# Patient Record
Sex: MALE | Race: WHITE | Employment: OTHER | URBAN - METROPOLITAN AREA
[De-identification: names, ages, dates, MRNs, and addresses within clinical notes are randomized per-mention and may not be internally consistent; named-entity substitution may affect disease eponyms.]

---

## 2019-04-26 ENCOUNTER — TRANSFERRED RECORDS (OUTPATIENT)
Dept: HEALTH INFORMATION MANAGEMENT | Facility: CLINIC | Age: 64
End: 2019-04-26

## 2019-05-02 ENCOUNTER — HOSPITAL ENCOUNTER (OUTPATIENT)
Dept: MRI IMAGING | Facility: HOSPITAL | Age: 64
Discharge: HOME | End: 2019-05-02
Attending: PHYSICIAN ASSISTANT | Admitting: PHYSICIAN ASSISTANT
Payer: COMMERCIAL

## 2019-05-02 DIAGNOSIS — S46.092A OTHER INJURY OF MUSCLE(S) AND TENDON(S) OF THE ROTATOR CUFF OF LEFT SHOULDER, INITIAL ENCOUNTER: ICD-10-CM

## 2019-05-02 PROCEDURE — 73221 MRI JOINT UPR EXTREM W/O DYE: CPT | Mod: TC,LT

## 2020-09-03 ENCOUNTER — HOSPITAL ENCOUNTER (EMERGENCY)
Facility: HOSPITAL | Age: 65
Discharge: HOME | End: 2020-09-03
Attending: EMERGENCY MEDICINE | Admitting: EMERGENCY MEDICINE
Payer: COMMERCIAL

## 2020-09-03 ENCOUNTER — APPOINTMENT (OUTPATIENT)
Dept: GENERAL RADIOLOGY | Facility: HOSPITAL | Age: 65
End: 2020-09-03
Attending: EMERGENCY MEDICINE
Payer: COMMERCIAL

## 2020-09-03 VITALS
RESPIRATION RATE: 15 BRPM | WEIGHT: 195 LBS | OXYGEN SATURATION: 96 % | HEART RATE: 87 BPM | DIASTOLIC BLOOD PRESSURE: 76 MMHG | TEMPERATURE: 96.6 F | HEIGHT: 75 IN | SYSTOLIC BLOOD PRESSURE: 113 MMHG | BODY MASS INDEX: 24.25 KG/M2

## 2020-09-03 DIAGNOSIS — I48.91 ATRIAL FIBRILLATION WITH RVR (H): Primary | ICD-10-CM

## 2020-09-03 LAB
ALBUMIN SERPL-MCNC: 4.2 G/DL (ref 3.4–5)
ALP SERPL-CCNC: 67 U/L (ref 40–150)
ALT SERPL W P-5'-P-CCNC: 38 U/L (ref 0–70)
ANION GAP SERPL CALCULATED.3IONS-SCNC: 3 MMOL/L (ref 3–14)
AST SERPL W P-5'-P-CCNC: 25 U/L (ref 0–45)
BASOPHILS # BLD AUTO: 0.1 10E9/L (ref 0–0.2)
BASOPHILS NFR BLD AUTO: 0.9 %
BILIRUB SERPL-MCNC: 0.4 MG/DL (ref 0.2–1.3)
BUN SERPL-MCNC: 13 MG/DL (ref 7–30)
CALCIUM SERPL-MCNC: 9.2 MG/DL (ref 8.5–10.1)
CHLORIDE SERPL-SCNC: 107 MMOL/L (ref 94–109)
CO2 SERPL-SCNC: 28 MMOL/L (ref 20–32)
CREAT SERPL-MCNC: 0.87 MG/DL (ref 0.66–1.25)
DIFFERENTIAL METHOD BLD: NORMAL
EOSINOPHIL # BLD AUTO: 0.1 10E9/L (ref 0–0.7)
EOSINOPHIL NFR BLD AUTO: 1.8 %
ERYTHROCYTE [DISTWIDTH] IN BLOOD BY AUTOMATED COUNT: 11.8 % (ref 10–15)
GFR SERPL CREATININE-BSD FRML MDRD: >90 ML/MIN/{1.73_M2}
GLUCOSE SERPL-MCNC: 129 MG/DL (ref 70–99)
HCT VFR BLD AUTO: 48.8 % (ref 40–53)
HGB BLD-MCNC: 17 G/DL (ref 13.3–17.7)
IMM GRANULOCYTES # BLD: 0 10E9/L (ref 0–0.4)
IMM GRANULOCYTES NFR BLD: 0.4 %
LYMPHOCYTES # BLD AUTO: 1.8 10E9/L (ref 0.8–5.3)
LYMPHOCYTES NFR BLD AUTO: 22.8 %
MCH RBC QN AUTO: 32.4 PG (ref 26.5–33)
MCHC RBC AUTO-ENTMCNC: 34.8 G/DL (ref 31.5–36.5)
MCV RBC AUTO: 93 FL (ref 78–100)
MONOCYTES # BLD AUTO: 0.9 10E9/L (ref 0–1.3)
MONOCYTES NFR BLD AUTO: 12 %
NEUTROPHILS # BLD AUTO: 4.8 10E9/L (ref 1.6–8.3)
NEUTROPHILS NFR BLD AUTO: 62.1 %
NRBC # BLD AUTO: 0 10*3/UL
NRBC BLD AUTO-RTO: 0 /100
PLATELET # BLD AUTO: 261 10E9/L (ref 150–450)
POTASSIUM SERPL-SCNC: 4.4 MMOL/L (ref 3.4–5.3)
PROT SERPL-MCNC: 7.9 G/DL (ref 6.8–8.8)
RBC # BLD AUTO: 5.25 10E12/L (ref 4.4–5.9)
SODIUM SERPL-SCNC: 138 MMOL/L (ref 133–144)
TROPONIN I SERPL-MCNC: <0.015 UG/L (ref 0–0.04)
TSH SERPL DL<=0.005 MIU/L-ACNC: 2.89 MU/L (ref 0.4–4)
WBC # BLD AUTO: 7.7 10E9/L (ref 4–11)

## 2020-09-03 PROCEDURE — 99285 EMERGENCY DEPT VISIT HI MDM: CPT | Mod: 25

## 2020-09-03 PROCEDURE — 96374 THER/PROPH/DIAG INJ IV PUSH: CPT

## 2020-09-03 PROCEDURE — 84484 ASSAY OF TROPONIN QUANT: CPT | Performed by: EMERGENCY MEDICINE

## 2020-09-03 PROCEDURE — 93005 ELECTROCARDIOGRAM TRACING: CPT

## 2020-09-03 PROCEDURE — 36415 COLL VENOUS BLD VENIPUNCTURE: CPT | Performed by: EMERGENCY MEDICINE

## 2020-09-03 PROCEDURE — 25000125 ZZHC RX 250: Performed by: EMERGENCY MEDICINE

## 2020-09-03 PROCEDURE — 84443 ASSAY THYROID STIM HORMONE: CPT | Performed by: EMERGENCY MEDICINE

## 2020-09-03 PROCEDURE — 93010 ELECTROCARDIOGRAM REPORT: CPT | Performed by: INTERNAL MEDICINE

## 2020-09-03 PROCEDURE — 80053 COMPREHEN METABOLIC PANEL: CPT | Performed by: EMERGENCY MEDICINE

## 2020-09-03 PROCEDURE — 85025 COMPLETE CBC W/AUTO DIFF WBC: CPT | Performed by: EMERGENCY MEDICINE

## 2020-09-03 PROCEDURE — 99285 EMERGENCY DEPT VISIT HI MDM: CPT | Mod: Z6 | Performed by: EMERGENCY MEDICINE

## 2020-09-03 PROCEDURE — 71045 X-RAY EXAM CHEST 1 VIEW: CPT | Mod: TC

## 2020-09-03 RX ORDER — DILTIAZEM HYDROCHLORIDE 5 MG/ML
25 INJECTION INTRAVENOUS ONCE
Status: COMPLETED | OUTPATIENT
Start: 2020-09-03 | End: 2020-09-03

## 2020-09-03 RX ORDER — ASPIRIN 325 MG
325 TABLET, DELAYED RELEASE (ENTERIC COATED) ORAL DAILY
Qty: 30 TABLET | Refills: 0 | Status: SHIPPED | OUTPATIENT
Start: 2020-09-03

## 2020-09-03 RX ORDER — BRIMONIDINE TARTRATE 2 MG/ML
1 SOLUTION/ DROPS OPHTHALMIC
COMMUNITY

## 2020-09-03 RX ORDER — METOPROLOL SUCCINATE 25 MG/1
25 TABLET, EXTENDED RELEASE ORAL DAILY
Qty: 30 TABLET | Refills: 0 | Status: SHIPPED | OUTPATIENT
Start: 2020-09-03

## 2020-09-03 RX ADMIN — DILTIAZEM HYDROCHLORIDE 25 MG: 5 INJECTION, SOLUTION INTRAVENOUS at 10:28

## 2020-09-03 ASSESSMENT — ENCOUNTER SYMPTOMS
ARTHRALGIAS: 0
EYE REDNESS: 0
DIFFICULTY URINATING: 0
PALPITATIONS: 1
NECK STIFFNESS: 0
SHORTNESS OF BREATH: 0
FEVER: 0
ABDOMINAL PAIN: 0
HEADACHES: 0
COLOR CHANGE: 0
CONFUSION: 0

## 2020-09-03 ASSESSMENT — MIFFLIN-ST. JEOR: SCORE: 1760.14

## 2020-09-03 NOTE — ED AVS SNAPSHOT
[facility]  [address]  [place] [address]  Phone:  [phone]                                    Kendrick Guadarrama   MRN: [id number]    Department:  [facility]   Date of Visit:  9/3/2020           After Visit Summary Signature Page    I have received my discharge instructions, and my questions have been answered. I have discussed any challenges I see with this plan with the nurse or doctor.    ..........................................................................................................................................  Patient/Patient Representative Signature      ..........................................................................................................................................  Patient Representative Print Name and Relationship to Patient    ..................................................               ................................................  Date                                   Time    ..........................................................................................................................................  Reviewed by Signature/Title    ...................................................              ..............................................  Date                                               Time          22EPIC Rev 08/18

## 2020-09-03 NOTE — ED PROVIDER NOTES
History     Chief Complaint   Patient presents with     Atrial Fib     HPI  Kendrick Guadarrama is a 65 year old male who presented emergency department with complaints of persistent heartbeat since 2 AM this morning.  Denies any chest pain or shortness of breath.  He had cardiac ablation 15 years ago to treat A. fib and since then he has been well though he occasionally gets momentarily irregular heartbeats that only lasts a few seconds then resolved.  He drinks lots of water every day and denies any fever or change in medical condition recently.  He does not have any chronic medical issues and only uses eyedrops for glaucoma.    Allergies:  No Known Allergies    Problem List:    Patient Active Problem List    Diagnosis Date Noted     Infection of finger 10/29/2014     Priority: Medium        Past Medical History:    Past Medical History:   Diagnosis Date     Glaucoma (increased eye pressure)        Past Surgical History:    Past Surgical History:   Procedure Laterality Date     AMPUTATE FINGER(S)  2013    Procedure: AMPUTATE FINGER(S);  LEFT INDEX FINGER COMPLETION AMPUTATION;  Surgeon: Oc Gregory MD;  Location: [facility]     COLONOSCOPY  2014    Procedure: COLONOSCOPY;  WHOLE COLON COLONOSCOPY WITH POLYPECTOMY;  Surgeon: Deborah Jj MD;  Location: [facility]     EYE SURGERY  2006    glaucoma     INCISION AND DRAINAGE FINGER, COMBINED Left 10/29/2014    Procedure: COMBINED INCISION AND DRAINAGE FINGER;  Surgeon: Oc Gregory MD;  Location: [facility]       Family History:    No family history on file.    Social History:  Marital Status:  Single [1]  Social History     Tobacco Use     Smoking status: Former Smoker     Last attempt to quit: 1997     Years since quittin.6   Substance Use Topics     Alcohol use: Yes     Alcohol/week: 10.0 standard drinks     Types: 12 Cans of beer per week     Drug use: No        Medications:    aspirin (ASA) 325 MG EC tablet  metoprolol succinate ER (TOPROL-XL) 25 MG 24  "hr tablet  brimonidine (ALPHAGAN) 0.2 % ophthalmic solution  brimonidine-timolol (COMBIGAN) 0.2-0.5 % ophthalmic solution  dorzolamide-timolol (COSOPT) 2-0.5 % ophthalmic solution  Latanoprost 0.005 % EMUL          Review of Systems   Constitutional: Negative for fever.   HENT: Negative for congestion.    Eyes: Negative for redness.   Respiratory: Negative for shortness of breath.    Cardiovascular: Positive for palpitations. Negative for chest pain.   Gastrointestinal: Negative for abdominal pain.   Genitourinary: Negative for difficulty urinating.   Musculoskeletal: Negative for arthralgias and neck stiffness.   Skin: Negative for color change.   Neurological: Negative for headaches.   Psychiatric/Behavioral: Negative for confusion.   All other systems reviewed and are negative.      Physical Exam   BP: 152/99  Pulse: (!) 128  Temp: 96.6  F (35.9  C)  Resp: 16  Height: 190.5 cm (6' 3\")  Weight: 88.5 kg (195 lb)  SpO2: 97 %      Physical Exam  Vitals signs and nursing note reviewed.   Constitutional:       General: He is not in acute distress.     Appearance: Normal appearance. He is well-developed. He is not diaphoretic.   HENT:      Head: Normocephalic and atraumatic.   Eyes:      Pupils: Pupils are equal, round, and reactive to light.   Cardiovascular:      Rate and Rhythm: Normal rate and regular rhythm.      Heart sounds: Normal heart sounds.   Pulmonary:      Effort: Pulmonary effort is normal. No respiratory distress.      Breath sounds: Normal breath sounds. No stridor.   Abdominal:      General: Bowel sounds are normal. There is no distension.      Tenderness: There is no abdominal tenderness.   Musculoskeletal:         General: No tenderness or deformity.   Neurological:      Mental Status: He is alert and oriented to person, place, and time.      Cranial Nerves: No cranial nerve deficit.         ED Course   Patient evaluated and laboratory tests ordered.  EKG done at 9:46 AM showed atrial fibrillation " with RVR at 136 bpm.  Cardizem 25 mg IV given-responded well with a heart rate reducing from 140 bpm to 90 bpm..    Procedures       EKG Interpretation:      Interpreted by Luis Nicole MD  Time reviewed: 9:50 AM  Symptoms at time of EKG: Irregular palpitations  Rhythm: Atrial fibrillation  Rate: 136 bpm  Axis: normal  Ectopy: none  Conduction: normal  ST Segments/ T Waves: No ST-T wave changes  Q Waves: none  Comparison to prior: No old EKG available    Clinical Impression: Atrial fibrillation with RVR at 136 bpm      Results for orders placed or performed during the hospital encounter of 09/03/20 (from the past 24 hour(s))   CBC with platelets differential   Result Value Ref Range    WBC 7.7 4.0 - 11.0 10e9/L    RBC Count 5.25 4.4 - 5.9 10e12/L    Hemoglobin 17.0 13.3 - 17.7 g/dL    Hematocrit 48.8 40.0 - 53.0 %    MCV 93 78 - 100 fl    MCH 32.4 26.5 - 33.0 pg    MCHC 34.8 31.5 - 36.5 g/dL    RDW 11.8 10.0 - 15.0 %    Platelet Count 261 150 - 450 10e9/L    Diff Method Automated Method     % Neutrophils 62.1 %    % Lymphocytes 22.8 %    % Monocytes 12.0 %    % Eosinophils 1.8 %    % Basophils 0.9 %    % Immature Granulocytes 0.4 %    Nucleated RBCs 0 0 /100    Absolute Neutrophil 4.8 1.6 - 8.3 10e9/L    Absolute Lymphocytes 1.8 0.8 - 5.3 10e9/L    Absolute Monocytes 0.9 0.0 - 1.3 10e9/L    Absolute Eosinophils 0.1 0.0 - 0.7 10e9/L    Absolute Basophils 0.1 0.0 - 0.2 10e9/L    Abs Immature Granulocytes 0.0 0 - 0.4 10e9/L    Absolute Nucleated RBC 0.0    Troponin I   Result Value Ref Range    Troponin I ES <0.015 0.000 - 0.045 ug/L   Comprehensive metabolic panel   Result Value Ref Range    Sodium 138 133 - 144 mmol/L    Potassium 4.4 3.4 - 5.3 mmol/L    Chloride 107 94 - 109 mmol/L    Carbon Dioxide 28 20 - 32 mmol/L    Anion Gap 3 3 - 14 mmol/L    Glucose 129 (H) 70 - 99 mg/dL    Urea Nitrogen 13 7 - 30 mg/dL    Creatinine 0.87 0.66 - 1.25 mg/dL    GFR Estimate >90 >60 mL/min/[1.73_m2]    GFR Estimate If Black  >90 >60 mL/min/[1.73_m2]    Calcium 9.2 8.5 - 10.1 mg/dL    Bilirubin Total 0.4 0.2 - 1.3 mg/dL    Albumin 4.2 3.4 - 5.0 g/dL    Protein Total 7.9 6.8 - 8.8 g/dL    Alkaline Phosphatase 67 40 - 150 U/L    ALT 38 0 - 70 U/L    AST 25 0 - 45 U/L   TSH   Result Value Ref Range    TSH 2.89 0.40 - 4.00 mU/L   XR Chest Port 1 View    Narrative    Procedure:XR CHEST PORT 1 VW    Clinical history:Male, 65 years, A. fib with RVR    Technique: Single view was obtained.    Comparison: None    Findings: The cardiac silhouette is normal. The pulmonary vasculature  is normal.    The lungs are clear. Bony structures are unremarkable.      Impression    Impression:   No acute abnormality. No evidence of acute or active cardiopulmonary  disease.    TAURUS CARMONA MD       Medications   diltiazem (CARDIZEM) injection 25 mg (25 mg Intravenous Given 9/3/20 1028)       Assessments & Plan (with Medical Decision Making)   Atrial fibrillation with RVR: Responded well to a single dose of Cardizem with heart rate reducing from 140 beats a minute to 90 bpm.  Remained stable throughout ED stay and was monitored for more than 2 hours.  Labs done today showed normal CBC, CMP, normal chest x-ray.  Normal TSH, negative troponin and patient was not able to give a urine sample.  Discussed my findings with the patient and will discharge home on Toprol-XL until patient is reviewed by his cardiologist.  I will defer initiation of anticoagulation therapy to patient's cardiologist or PCP.  He will benefit from antiplatelet therapy and was started on daily aspirin.     I have reviewed the nursing notes.    I have reviewed the findings, diagnosis, plan and need for follow up with the patient.    New Prescriptions    ASPIRIN (ASA) 325 MG EC TABLET    Take 1 tablet (325 mg) by mouth daily    METOPROLOL SUCCINATE ER (TOPROL-XL) 25 MG 24 HR TABLET    Take 1 tablet (25 mg) by mouth daily       Final diagnoses:   Atrial fibrillation with RVR (H)       9/3/2020    [facility]     Luis Nicole MD  09/03/20 0308

## 2020-09-03 NOTE — ED TRIAGE NOTES
Pt in for complaints of atrial fib. Reports sx awoke him from sleep and 0200 this am. Reports he has had an ablation in the past and has not had sx since that time until this am. Denies chest pain,sob, dizziness.

## 2024-07-11 ENCOUNTER — TRANSFERRED RECORDS (OUTPATIENT)
Dept: HEALTH INFORMATION MANAGEMENT | Facility: CLINIC | Age: 69
End: 2024-07-11

## 2024-07-11 ENCOUNTER — ANCILLARY PROCEDURE (OUTPATIENT)
Dept: GENERAL RADIOLOGY | Facility: OTHER | Age: 69
End: 2024-07-11
Attending: PHYSICIAN ASSISTANT
Payer: COMMERCIAL

## 2024-07-11 DIAGNOSIS — M25.571 RIGHT ANKLE PAIN: ICD-10-CM

## 2024-07-11 PROCEDURE — 73610 X-RAY EXAM OF ANKLE: CPT | Mod: TC | Performed by: RADIOLOGY

## 2024-08-27 ENCOUNTER — MEDICAL CORRESPONDENCE (OUTPATIENT)
Dept: MRI IMAGING | Facility: HOSPITAL | Age: 69
End: 2024-08-27

## 2024-09-11 ENCOUNTER — HOSPITAL ENCOUNTER (OUTPATIENT)
Dept: MRI IMAGING | Facility: HOSPITAL | Age: 69
Discharge: HOME | End: 2024-09-11
Attending: PODIATRIST | Admitting: PODIATRIST
Payer: COMMERCIAL

## 2024-09-11 DIAGNOSIS — M76.60 ACHILLES TENDONITIS: ICD-10-CM

## 2024-09-11 PROCEDURE — 73718 MRI LOWER EXTREMITY W/O DYE: CPT | Mod: RT
